# Patient Record
Sex: FEMALE | Race: WHITE
[De-identification: names, ages, dates, MRNs, and addresses within clinical notes are randomized per-mention and may not be internally consistent; named-entity substitution may affect disease eponyms.]

---

## 2017-05-17 ENCOUNTER — HOSPITAL ENCOUNTER (EMERGENCY)
Dept: HOSPITAL 62 - ER | Age: 32
Discharge: HOME | End: 2017-05-17
Payer: MEDICAID

## 2017-05-17 DIAGNOSIS — L03.317: ICD-10-CM

## 2017-05-17 DIAGNOSIS — L73.9: Primary | ICD-10-CM

## 2017-05-17 DIAGNOSIS — Z72.0: ICD-10-CM

## 2017-05-17 PROCEDURE — 99283 EMERGENCY DEPT VISIT LOW MDM: CPT

## 2017-08-30 ENCOUNTER — HOSPITAL ENCOUNTER (OUTPATIENT)
Dept: HOSPITAL 62 - OD | Age: 32
End: 2017-08-30
Attending: INTERNAL MEDICINE
Payer: MEDICAID

## 2017-08-30 DIAGNOSIS — Z00.00: Primary | ICD-10-CM

## 2017-08-30 LAB
ADD HIVPANEL?: NO
ALBUMIN SERPL-MCNC: 4.4 G/DL (ref 3.5–5)
ALP SERPL-CCNC: 52 U/L (ref 38–126)
ALT SERPL-CCNC: 31 U/L (ref 9–52)
ANION GAP SERPL CALC-SCNC: 13 MMOL/L (ref 5–19)
AST SERPL-CCNC: 19 U/L (ref 14–36)
BASOPHILS # BLD AUTO: 0 10^3/UL (ref 0–0.2)
BASOPHILS NFR BLD AUTO: 0.8 % (ref 0–2)
BILIRUB DIRECT SERPL-MCNC: 0.3 MG/DL (ref 0–0.4)
BILIRUB SERPL-MCNC: 1 MG/DL (ref 0.2–1.3)
BUN SERPL-MCNC: 11 MG/DL (ref 7–20)
CALCIUM: 9.9 MG/DL (ref 8.4–10.2)
CHLORIDE SERPL-SCNC: 103 MMOL/L (ref 98–107)
CHOLEST SERPL-MCNC: 213.65 MG/DL (ref 0–200)
CO2 SERPL-SCNC: 25 MMOL/L (ref 22–30)
CREAT SERPL-MCNC: 0.79 MG/DL (ref 0.52–1.25)
DIRECT HDL: 66 MG/DL (ref 40–?)
EOSINOPHIL # BLD AUTO: 0.2 10^3/UL (ref 0–0.6)
EOSINOPHIL NFR BLD AUTO: 4.3 % (ref 0–6)
ERYTHROCYTE [DISTWIDTH] IN BLOOD BY AUTOMATED COUNT: 13 % (ref 11.5–14)
GLUCOSE SERPL-MCNC: 89 MG/DL (ref 75–110)
HCT VFR BLD CALC: 43.5 % (ref 36–47)
HGB BLD-MCNC: 14.7 G/DL (ref 12–15.5)
HGB HCT DIFFERENCE: 0.6
HIV (1 AND 2) ANTIBODY: NEGATIVE
LDLC SERPL DIRECT ASSAY-MCNC: 102 MG/DL (ref ?–100)
LYMPHOCYTES # BLD AUTO: 1.4 10^3/UL (ref 0.5–4.7)
LYMPHOCYTES NFR BLD AUTO: 25.7 % (ref 13–45)
MCH RBC QN AUTO: 31.7 PG (ref 27–33.4)
MCHC RBC AUTO-ENTMCNC: 33.8 G/DL (ref 32–36)
MCV RBC AUTO: 94 FL (ref 80–97)
MONOCYTES # BLD AUTO: 0.4 10^3/UL (ref 0.1–1.4)
MONOCYTES NFR BLD AUTO: 7.2 % (ref 3–13)
NEUTROPHILS # BLD AUTO: 3.3 10^3/UL (ref 1.7–8.2)
NEUTS SEG NFR BLD AUTO: 62 % (ref 42–78)
POTASSIUM SERPL-SCNC: 4.7 MMOL/L (ref 3.6–5)
PROT SERPL-MCNC: 6.7 G/DL (ref 6.3–8.2)
RBC # BLD AUTO: 4.64 10^6/UL (ref 3.72–5.28)
SODIUM SERPL-SCNC: 141.1 MMOL/L (ref 137–145)
TRIGL SERPL-MCNC: 119 MG/DL (ref ?–150)
VLDLC SERPL CALC-MCNC: 24 MG/DL (ref 10–31)
WBC # BLD AUTO: 5.4 10^3/UL (ref 4–10.5)

## 2017-08-30 PROCEDURE — 80053 COMPREHEN METABOLIC PANEL: CPT

## 2017-08-30 PROCEDURE — 86701 HIV-1ANTIBODY: CPT

## 2017-08-30 PROCEDURE — 80061 LIPID PANEL: CPT

## 2017-08-30 PROCEDURE — 36415 COLL VENOUS BLD VENIPUNCTURE: CPT

## 2017-08-30 PROCEDURE — 85025 COMPLETE CBC W/AUTO DIFF WBC: CPT

## 2018-04-13 ENCOUNTER — HOSPITAL ENCOUNTER (EMERGENCY)
Dept: HOSPITAL 62 - ER | Age: 33
LOS: 1 days | Discharge: HOME | End: 2018-04-14
Payer: MEDICAID

## 2018-04-13 DIAGNOSIS — Z87.891: ICD-10-CM

## 2018-04-13 DIAGNOSIS — G43.901: Primary | ICD-10-CM

## 2018-04-13 DIAGNOSIS — R11.2: ICD-10-CM

## 2018-04-13 PROCEDURE — 96374 THER/PROPH/DIAG INJ IV PUSH: CPT

## 2018-04-13 PROCEDURE — 70450 CT HEAD/BRAIN W/O DYE: CPT

## 2018-04-13 PROCEDURE — 96375 TX/PRO/DX INJ NEW DRUG ADDON: CPT

## 2018-04-13 PROCEDURE — 96361 HYDRATE IV INFUSION ADD-ON: CPT

## 2018-04-13 PROCEDURE — 99284 EMERGENCY DEPT VISIT MOD MDM: CPT

## 2018-04-13 NOTE — ER DOCUMENT REPORT
ED Medical Screen (RME)





- General


Chief Complaint: Nausea/Vomiting


Stated Complaint: HEADACHE


Time Seen by Provider: 18 19:37


Notes: 





32-year-old female patient comes emergency room by EMS for headache she reports 

started about 2 PM and got worse.  She states she has a history of the same 

headaches.  EMS gave Toradol and Zofran.  She states the headache may be a 

little bit better.


Brief exam shows tender posterior cervical muscles, tender temporal and 

forehead muscles.


Patient is quite anxious, she is a quite a poor historian and when asked a 

direct question frequently answers with the response completely unrelated to 

the question that was asked.


TRAVEL OUTSIDE OF THE [place] IN LAST 30 DAYS: No





- Related Data


Allergies/Adverse Reactions: 


 





nickel [Nickel] Adverse Reaction (Verified 16 04:02)


 











Past Medical History





- Past Medical History


Cardiac Medical History: 


   Denies: Hx DVT, Hx Hypercholesterolemia, Hx Hypertension


Psychiatric Medical History: Reports: Hx Anxiety


Past Surgical History: Reports: Hx  Section





- Immunizations


Hx Diphtheria, Pertussis, Tetanus Vaccination: Yes - 





Physical Exam





- Vital signs


Vitals: 


 











Temp Pulse Resp BP Pulse Ox


 


 97.7 F   96   28 H  131/80 H  99 


 


 18 19:24  18 19:24  18 19:24  18 19:24  18 19:24














Course





- Vital Signs


Vital signs: 


 











Temp Pulse Resp BP Pulse Ox


 


 97.7 F   96   28 H  131/80 H  99 


 


 18 19:24  18 19:24  18 19:24  18 19:24  18 19:24

## 2018-04-13 NOTE — RADIOLOGY REPORT (SQ)
EXAM DESCRIPTION:  CT HEAD WITHOUT



COMPLETED DATE/TIME:  4/13/2018 8:57 pm



REASON FOR STUDY:  worst HA ever



COMPARISON:  None.



TECHNIQUE:  Axial images acquired through the brain without intravenous contrast.  Images reviewed wi
th bone, brain and subdural windows.   Images stored on PACS.

All CT scanners at this facility use dose modulation, iterative reconstruction, and/or weight based d
osing when appropriate to reduce radiation dose to as low as reasonably achievable (ALARA).

[facility]: Dose Right  [facility]: CareDose    [facility]: Dose Right    [facility]: Teradose 4D    [facility]: Smart Technologies



RADIATION DOSE:  CT Rad equipment meets quality standard of care and radiation dose reduction techniq
ues were employed. CTDIvol: 53.2 mGy. DLP: 991 mGy-cm. mGy.



LIMITATIONS:  None.



FINDINGS:  VENTRICLES: Normal size and contour.

CEREBRUM: No masses.  No hemorrhage.  No midline shift.  No evidence for acute infarction. Normal gra
y/white matter differentiation. No areas of low density in the white matter.

CEREBELLUM: No masses.  No hemorrhage.  No alteration of density.  No evidence for acute infarction.

EXTRAAXIAL SPACES: No fluid collections.  No masses.

ORBITS AND GLOBE: No intra- or extraconal masses.  Normal contour of globe without masses.

CALVARIUM: No fracture.

PARANASAL SINUSES: No fluid or mucosal thickening.

SOFT TISSUES: No mass or hematoma.

OTHER: No other significant finding.



IMPRESSION:  NORMAL BRAIN CT WITHOUT CONTRAST.

EVIDENCE OF ACUTE STROKE: NO.



COMMENT:  Quality ID # [id number]: Final reports with documentation of one or more dose reduction techniques
 (e.g., Automated exposure control, adjustment of the mA and/or kV according to patient size, use of 
iterative reconstruction technique)



TECHNICAL DOCUMENTATION:  JOB ID:  [id number]

 [employer]- All Rights Reserved



Reading location - IP/workstation name: [email or web address]

## 2018-04-13 NOTE — ER DOCUMENT REPORT
ED General





- General


Chief Complaint: Nausea/Vomiting


Stated Complaint: HEADACHE


Time Seen by Provider: 18 19:37


Mode of Arrival: Medic


Information source: Patient


TRAVEL OUTSIDE OF THE [place] IN LAST 30 DAYS: No





- HPI


Patient complains to provider of: Headache with vomiting


Onset: This afternoon


Onset/Duration: Sudden


Quality of pain: Sharp, Stabbing


Severity: Severe


Pain Level: 5


Context: 





Patient states she has had headaches in the past, but this is the worst she 

ever had.


Associated symptoms: Headache, Nausea, Vomiting.  denies: Fever


Exacerbated by: Denies


Relieved by: Denies


Similar symptoms previously: Yes


Recently seen / treated by doctor: No





- Related Data


Allergies/Adverse Reactions: 


 





nickel [Nickel] Adverse Reaction (Verified 16 04:02)


 











Past Medical History





- General


Information source: Patient





- Social History


Smoking Status: Former Smoker


Chew tobacco use (# tins/day): No


Frequency of alcohol use: None


Drug Abuse: None


Lives with: Family


Family History: Arthritis, DM, Hyperlipidemia, Malignancy


Patient has suicidal ideation: No


Patient has homicidal ideation: No


Neurological Medical History: Reports: Hx Migraine


Psychiatric Medical History: Reports: Hx Anxiety


Past Surgical History: Reports: Hx  Section





- Immunizations


Hx Diphtheria, Pertussis, Tetanus Vaccination: Yes - 





Review of Systems





- Review of Systems


Constitutional: No symptoms reported


EENT: No symptoms reported


Cardiovascular: No symptoms reported


Respiratory: No symptoms reported


Gastrointestinal: See HPI


Genitourinary: No symptoms reported


Musculoskeletal: No symptoms reported


Skin: No symptoms reported


Hematologic/Lymphatic: No symptoms reported


Neurological/Psychological: See HPI


-: Yes All other systems reviewed and negative





Physical Exam





- Vital signs


Vitals: 


 











Temp Pulse Resp BP Pulse Ox


 


 97.7 F   96   28 H  131/80 H  99 


 


 18 19:24  18 19:24  18 19:24  18 19:24  18 19:24














- General


General appearance: Alert, Anxious


In distress: Moderate


Notes: 





Patient vomited 1 during exam.





- HEENT


Head: Normocephalic, Atraumatic


Eyes: Normal


Conjunctiva: Normal


Pupils: PERRL


Nasal: Normal


Mucous membranes: Normal


Pharynx: Normal


Neck: Normal





- Respiratory


Respiratory status: No respiratory distress


Breath sounds: Normal





- Cardiovascular


Rhythm: Regular


Heart sounds: Normal auscultation





- Abdominal


Inspection: Normal


Distension: No distension


Bowel sounds: Normal


Tenderness: Tender - epigastric area





- Extremities


General upper extremity: Normal inspection


General lower extremity: Normal inspection





- Neurological


Neuro grossly intact: Yes


Cognition: Normal


Orientation: AAOx4





- Psychological


Associated symptoms: Anxious, Tearful





- Skin


Skin Temperature: Warm


Skin Moisture: Dry


Skin Color: Normal





Course





- Re-evaluation


Re-evalutation: 





18 22:17


CAT scan normal and this was discussed with patient.  Patient states headache 

is tolerable and is now down to a 1.  Patient is sleepy from medications, but 

state she feels much better.


18 23:02








- Vital Signs


Vital signs: 


 











Temp Pulse Resp BP Pulse Ox


 


 97.7 F   96   28 H  131/80 H  99 


 


 18 19:24  18 19:24  18 19:24  18 19:24  18 19:24














Discharge





- Discharge


Clinical Impression: 


Headache, migraine


Qualifiers:


 Migraine type: unspecified Status migrainosus presence: with status 

migrainosus Intractability: not intractable Qualified Code(s): G43.901 - 

Migraine, unspecified, not intractable, with status migrainosus





Condition: Good


Disposition: HOME, SELF-CARE


Additional Instructions: 


continue ibuprofen and tylenol as needed


zofran for nausea


follow up with your PCP Monday for recheck


return as needed


Prescriptions: 


Ondansetron [Zofran Odt] 8 mg PO TID PRN #10 tab.rapdis


 PRN Reason: 


Referrals: 


WOLF BRADFORD MD [Primary Care Provider] - Follow up as needed

## 2018-04-14 VITALS — SYSTOLIC BLOOD PRESSURE: 124 MMHG | DIASTOLIC BLOOD PRESSURE: 64 MMHG

## 2019-12-30 ENCOUNTER — HOSPITAL ENCOUNTER (EMERGENCY)
Dept: HOSPITAL 62 - ER | Age: 34
Discharge: HOME | End: 2019-12-30
Payer: MEDICAID

## 2019-12-30 VITALS — SYSTOLIC BLOOD PRESSURE: 135 MMHG | DIASTOLIC BLOOD PRESSURE: 79 MMHG

## 2019-12-30 DIAGNOSIS — R10.2: ICD-10-CM

## 2019-12-30 DIAGNOSIS — N73.9: Primary | ICD-10-CM

## 2019-12-30 DIAGNOSIS — A74.9: ICD-10-CM

## 2019-12-30 LAB
ADD MANUAL DIFF: NO
ALBUMIN SERPL-MCNC: 4.1 G/DL (ref 3.5–5)
ALP SERPL-CCNC: 51 U/L (ref 38–126)
ANION GAP SERPL CALC-SCNC: 8 MMOL/L (ref 5–19)
APPEARANCE UR: (no result)
APTT PPP: YELLOW S
AST SERPL-CCNC: 66 U/L (ref 14–36)
BACTERIA (WET MOUNT): (no result)
BASOPHILS # BLD AUTO: 0 10^3/UL (ref 0–0.2)
BASOPHILS NFR BLD AUTO: 0.6 % (ref 0–2)
BILIRUB DIRECT SERPL-MCNC: 0.2 MG/DL (ref 0–0.4)
BILIRUB SERPL-MCNC: 1.3 MG/DL (ref 0.2–1.3)
BILIRUB UR QL STRIP: NEGATIVE
BUN SERPL-MCNC: 11 MG/DL (ref 7–20)
CALCIUM: 9.6 MG/DL (ref 8.4–10.2)
CHLAM PCR: DETECTED
CHLORIDE SERPL-SCNC: 107 MMOL/L (ref 98–107)
CO2 SERPL-SCNC: 25 MMOL/L (ref 22–30)
EOSINOPHIL # BLD AUTO: 0.1 10^3/UL (ref 0–0.6)
EOSINOPHIL NFR BLD AUTO: 1.4 % (ref 0–6)
EPITHELIALS (WET MOUNT): (no result)
ERYTHROCYTE [DISTWIDTH] IN BLOOD BY AUTOMATED COUNT: 12.8 % (ref 11.5–14)
GLUCOSE SERPL-MCNC: 76 MG/DL (ref 75–110)
GLUCOSE UR STRIP-MCNC: NEGATIVE MG/DL
HCT VFR BLD CALC: 42.1 % (ref 36–47)
HGB BLD-MCNC: 14.7 G/DL (ref 12–15.5)
KETONES UR STRIP-MCNC: NEGATIVE MG/DL
LYMPHOCYTES # BLD AUTO: 1.2 10^3/UL (ref 0.5–4.7)
LYMPHOCYTES NFR BLD AUTO: 18.7 % (ref 13–45)
MCH RBC QN AUTO: 32.3 PG (ref 27–33.4)
MCHC RBC AUTO-ENTMCNC: 34.9 G/DL (ref 32–36)
MCV RBC AUTO: 93 FL (ref 80–97)
MONOCYTES # BLD AUTO: 0.7 10^3/UL (ref 0.1–1.4)
MONOCYTES NFR BLD AUTO: 10.2 % (ref 3–13)
NEUTROPHILS # BLD AUTO: 4.5 10^3/UL (ref 1.7–8.2)
NEUTS SEG NFR BLD AUTO: 69.1 % (ref 42–78)
NITRITE UR QL STRIP: NEGATIVE
PH UR STRIP: 6 [PH] (ref 5–9)
PLATELET # BLD: 215 10^3/UL (ref 150–450)
POTASSIUM SERPL-SCNC: 4.5 MMOL/L (ref 3.6–5)
PROT SERPL-MCNC: 7 G/DL (ref 6.3–8.2)
PROT UR STRIP-MCNC: NEGATIVE MG/DL
RBC # BLD AUTO: 4.54 10^6/UL (ref 3.72–5.28)
RBCS (WET MOUNT): (no result)
SP GR UR STRIP: 1.02
T.VAGINALIS (WET MOUNT): (no result)
TOTAL CELLS COUNTED % (AUTO): 100 %
UROBILINOGEN UR-MCNC: NEGATIVE MG/DL (ref ?–2)
WBC # BLD AUTO: 6.5 10^3/UL (ref 4–10.5)
WBCS (WET MOUNT): (no result)
YEAST (WET MOUNT): (no result)

## 2019-12-30 PROCEDURE — 83690 ASSAY OF LIPASE: CPT

## 2019-12-30 PROCEDURE — 87210 SMEAR WET MOUNT SALINE/INK: CPT

## 2019-12-30 PROCEDURE — 81001 URINALYSIS AUTO W/SCOPE: CPT

## 2019-12-30 PROCEDURE — 99283 EMERGENCY DEPT VISIT LOW MDM: CPT

## 2019-12-30 PROCEDURE — 85025 COMPLETE CBC W/AUTO DIFF WBC: CPT

## 2019-12-30 PROCEDURE — 87591 N.GONORRHOEAE DNA AMP PROB: CPT

## 2019-12-30 PROCEDURE — 81025 URINE PREGNANCY TEST: CPT

## 2019-12-30 PROCEDURE — 87491 CHLMYD TRACH DNA AMP PROBE: CPT

## 2019-12-30 PROCEDURE — 36415 COLL VENOUS BLD VENIPUNCTURE: CPT

## 2019-12-30 PROCEDURE — 96372 THER/PROPH/DIAG INJ SC/IM: CPT

## 2019-12-30 PROCEDURE — 80053 COMPREHEN METABOLIC PANEL: CPT

## 2019-12-30 NOTE — ER DOCUMENT REPORT
ED Medical Screen (RME)





- General


Chief Complaint: Urinary Problem


Stated Complaint: POSSIBLE UTI


Time Seen by Provider: 19 10:26


Primary Care Provider: 


WOLF BRADFORD MD [Primary Care Provider] - Follow up as needed


Notes: 





Patient is a 34-year-old female who presents to the emergency department with a 

chief complaint of abdominal pain.  Patient reports she developed vaginal 

discharge about 1 week ago.  Patient reports this is green in color.  Patient 

reports she took Monistat at that time and that the vaginal discharge has 

improved.  Patient reports she is sexually active and not on birth control.  Benjamin morris reports her last menstrual cycle was  and there is a concern 

for STI.  Patient reports over the past 4 days she has had an increase in right 

lower quadrant pain and right lower back pain.  Patient reports that she feels 

like she cannot empty her bladder and she feels full.  Patient denies fever or 

chills.  Patient reports she is just become more uncomfortable which prompted 

her to come to the emergency department.


TRAVEL OUTSIDE OF THE [place] IN LAST 30 DAYS: No





- Related Data


Allergies/Adverse Reactions: 


                                        





nickel [Nickel] Adverse Reaction (Verified 16 04:02)


   











Past Medical History





- Past Medical History


Cardiac Medical History: 


   Denies: Hx DVT, Hx Hypercholesterolemia, Hx Hypertension


Neurological Medical History: Reports: Hx Migraine


Renal/ Medical History: Denies: Hx Peritoneal Dialysis


Psychiatric Medical History: Reports: Hx Anxiety


Past Surgical History: Reports: Hx  Section





- Immunizations


Hx Diphtheria, Pertussis, Tetanus Vaccination: Yes - 





Physical Exam





- Vital signs


Vitals: 





                                        











Temp Pulse Resp BP Pulse Ox


 


 98.1 F   102 H  16   135/79 H  98 


 


 19 09:58  19 09:58  19 09:58  19 09:58  19 09:58














- Abdominal


Inspection: Normal


Distension: No distension


Bowel sounds: Normal


Tenderness: Tender - Generalized abdominal pain with palpation, worse in RLQ.





Course





- Re-evaluation


Re-evalutation: 





19 10:34


We will initiate basic labs, urinalysis and hCG.  Patient will require a th

orough abdominal examination once on a stretcher to determine specific location 

of her pain.





I have greeted and performed a rapid initial assessment of this patient.  A 

comprehensive ED assessment and evaluation of the patient, analysis of test 

results and completion of the medical decision making process will be conducted 

by additional ED providers.





- Vital Signs


Vital signs: 





                                        











Temp Pulse Resp BP Pulse Ox


 


 98.1 F   102 H  16   135/79 H  98 


 


 19 09:58  19 09:58  19 09:58  19 09:58  19 09:58














Doctor's Discharge





- Discharge


Referrals: 


WOLF BRADFORD MD [Primary Care Provider] - Follow up as needed

## 2019-12-30 NOTE — ER DOCUMENT REPORT
ED GI/





- General


Chief Complaint: Urinary Problem


Stated Complaint: POSSIBLE UTI


Time Seen by Provider: 19 10:26


Primary Care Provider: 


[facility] [Provider Group] - Follow up in 1 week


WOLF BRADFORD MD [Primary Care Provider] - Follow up as needed


Notes: 





Patient is a 34-year-old female who presents to the emergency department with a 

chief complaint of vaginal discharge.  Patient states that she has had sex with 

3 different people recently.  She is concerned for possible STIs.  She had some 

vaginal discharge and tried to use Monistat, but did not have relief of her 

symptoms.


TRAVEL OUTSIDE OF THE [place] IN LAST 30 DAYS: No





- Related Data


Allergies/Adverse Reactions: 


                                        





nickel [Nickel] Adverse Reaction (Verified 16 04:02)


   











Past Medical History





- Social History


Smoking Status: Unknown if Ever Smoked


Family History: Arthritis, DM, Hyperlipidemia, Malignancy


Patient has suicidal ideation: No


Patient has homicidal ideation: No





- Past Medical History


Cardiac Medical History: 


   Denies: Hx DVT, Hx Hypercholesterolemia, Hx Hypertension


Neurological Medical History: Reports: Hx Migraine


Renal/ Medical History: Denies: Hx Peritoneal Dialysis


Psychiatric Medical History: Reports: Hx Anxiety


Past Surgical History: Reports: Hx  Section





- Immunizations


Hx Diphtheria, Pertussis, Tetanus Vaccination: Yes - 





Review of Systems





- Review of Systems


Notes: 





REVIEW OF SYSTEMS:





CONSTITUTIONAL :    Denies recent illness.  Denies recent unintentional weight 

loss.  Denies fever,  chills, or sweats. 


EENT: Denies eye, ear, throat, or mouth pain, discharge, or symptoms.  Denies 

nasal or sinus congestion.


CARDIOVASCULAR:  Denies chest pain.


RESPIRATORY: Denies shortness of breath, cough, congestion, difficulty 

breathing, or wheezing. 


GASTROINTESTINAL: Denies nausea, vomiting, and diarrhea.  Denies abdominal pain.

  Denies constipation. 


GENITOURINARY:  Denies difficulty urinating, burning, blood in urine, urgency or

 frequency.


FEMALE  GENITOURINARY: See HPI.


MUSCULOSKELETAL:  Denies neck and back pain.  Denies joint pain or swelling.


SKIN:   Denies rash, itchiness, or lesions


HEMATOLOGIC :   Denies easy bruising or bleeding.


LYMPHATIC:  Denies swollen, painful, enlarged glands.


NEUROLOGICAL: Denies no numbness or tingling denies weakness.  Denies headache. 

 Denies altered mental status.  Denies alteration in speech.


PSYCHIATRIC:  Denies stress, anxiety, alteration in sleep patterns, or 

depression.





All other systems reviewed and negative.





Physical Exam





- Vital signs


Vitals: 


                                        











Temp Pulse Resp BP Pulse Ox


 


 98.1 F   102 H  16   135/79 H  98 


 


 19 09:58  19 09:58  19 09:58  19 09:58  19 09:58














- Notes


Notes: 





PHYSICAL EXAMINATION:





GENERAL: Appears well, healthy, well-nourished, no acute distress. 





HEAD:  Normocephalic, atraumatic.





EYES:  PERRL, conjunctiva normal, all extraocular movements intact, sclera 

nonicteric





ENT:  Moist mucous membranes. 





NECK: Supple, no noticeable swelling, redness, rash.  Normal range of motion.





LUNGS: Equal breath sounds bilaterally and clear to auscultation.  No wheezes 

rales or rhonchi.





CARDIOVASCULAR: S1-S2, regular rate, regular rhythm.  Radial pulses 2+, normal.





ABDOMEN: Normoactive bowel sounds.  Soft, nontender,  no guarding, no rebound 

tenderness, and no masses palpated.





EXTREMITIES: Normal strength and range of motion, no pitting or edema.  No 

cyanosis. 





NEUROLOGICAL: Moves all extremities upon command.  Strength 5/5 in all 

extremities. 





PSYCH: Normal mood, normal affect.





SKIN: Warm, dry.  No rash, lesions, ulcerations noted.  Normal skin turgor.





GYN: Green/yellow discharge noted in vagina.  Cervical motion tenderness noted 

with mild right adnexal tenderness noted.





Course





- Re-evaluation


Re-evalutation: 





19 12:34


ARIEL Chavis at bedside for pelvic exam as chaperone.  Patient did have some 

green/yellow discharge noted.  Patient had cervical motion tenderness and right 

adnexal tenderness noted.


19 12:55


Patient has 4+ epithelial cells and 4+ bacteria noted on her wet mount with 1+ 

WBCs.  No trichomonas or yeast noted.  She does have a little bit of as stated 

above, the patient will be treated for pelvic inflammatory disease.  Very low 

suspicion for appendicitis.  Follow-up precautions were given.  Verbal discharge

 instructions were given to the patient.  They verbalized understanding.  They 

are stable for discharge.





19 16:00


Patient earlier today and gave her the results of her gonorrhea and Chlamydia 

tests.  She is positive for chlamydia.  She states that she will tell the other 

people she has had sex with that she tested positive for chlamydia.  Patient was

 empirically treated here in the emergency department earlier.








- Vital Signs


Vital signs: 


                                        











Temp Pulse Resp BP Pulse Ox


 


 98.1 F   102 H  16   135/79 H  98 


 


 19 09:58  19 09:58  19 09:58  19 09:58  19 09:58














- Laboratory


Result Diagrams: 


                                 19 11:20





                                 19 11:20


Laboratory results interpreted by me: 


                                        











  19





  10:45 11:20 12:30


 


AST   66 H 


 


Ur Leukocyte Esterase  MODERATE H  


 


Chlamydia DNA (PCR)    DETECTED H














Discharge





- Discharge


Clinical Impression: 


 Pelvic inflammatory disease, Vaginal pain





Condition: Stable


Disposition: HOME, SELF-CARE


Additional Instructions: 


Your are being treated for pelvic inflammatory disease.  You are being started 

on 2 different antibiotics and you need to take these until you finish them.  

Please return if you have worsening pain, persistent vomiting, spike a fever 

greater than 101F, or have any other symptoms that are concerning to you.  

Please follow closely with you primary care physician or your OB/GYN at your 

earliest ability.


Prescriptions: 


Doxycycline Hyclate 100 mg PO BID #28 capsule


Metronidazole [Flagyl 500 mg Tablet] 500 mg PO Q6H #28 tablet


Referrals: 


WOLF BRADFORD MD [Primary Care Provider] - Follow up as needed


[facility] [Provider Group] - Follow up in 1 week